# Patient Record
(demographics unavailable — no encounter records)

---

## 2025-05-02 NOTE — ASSESSMENT
[FreeTextEntry1] : 44 year old male with thoracic and lumbar strain, lumbar radiculopathy.  I have provided him with a prescription to attend PT 2-3 times per week and have prescribed Nabumetone 750 mg BID prn pain with food as well as Tizanidine 4mg 1/2-1 tab po bid prn.  He will f/u in 2 weeks for reassessment and will RTW on May 12, 2025 light duty.  He is aware if there is any issue he can contact the office and he verbalizes his understanding and agreement.

## 2025-05-02 NOTE — HISTORY OF PRESENT ILLNESS
[de-identified] : Mr. Julien is a 44 year old male who presents to the office accompanied by his wife for evaluation of mid and lower back pain that began 3 days ago.  He states he is unsure what he did but woke up Tuesday morning in excruciating pain stating he could not breathe without feeling pain in the midback.  He also states the pain radiates down the L leg into his thigh but denies any parasthesias.  He states pain has improved since Tuesday although still continues especially with bending, standing, or ambulating for prolonged periods.  He has been using Aleve with minimal improvement.  He denies any loss of motion but does experience stiffness.

## 2025-05-02 NOTE — IMAGING
[de-identified] : lumbar spine:  5/5 strength, 2+ reflexes, mildly positive SLR on the L at 45 degrees, full ROM with flexion with pain and stiffness, NV intact, + tenderness bilateral lumbar paraspinals.  X-ray Lumbar spine 4 views: mild loss of disc height at L4-5, no fractures or bony abnormalities noted.  thoracic spine:  5/5 strength upper extremities, 2+ reflexes, + tenderness bilateral thoracic paraspinals L>R, NV intact.  X-ray thoracic spine 2 views: no fractures or bony abnormalities noted.

## 2025-05-19 NOTE — IMAGING
[de-identified] : lumbar spine:  5/5 strength, 2+ reflexes, mildly positive SLR on the L at 45 degrees, full ROM with flexion with pain and stiffness, NV intact, + tenderness bilateral lumbar paraspinals.  thoracic spine:  5/5 strength upper extremities, 2+ reflexes, + tenderness bilateral thoracic paraspinals L>R, NV intact.

## 2025-05-19 NOTE — HISTORY OF PRESENT ILLNESS
[de-identified] : ORIGINAL PRESENTATION: Mr. Julien is a 44 year old male who presents to the office accompanied by his wife for evaluation of mid and lower back pain that began 3 days ago.  He states he is unsure what he did but woke up Tuesday morning in excruciating pain stating he could not breathe without feeling pain in the midback.  He also states the pain radiates down the L leg into his thigh but denies any parasthesias.  He states pain has improved since Tuesday although still continues especially with bending, standing, or ambulating for prolonged periods.  He has been using Aleve with minimal improvement.  He denies any loss of motion but does experience stiffness.    TODAY:  I had the pleasure of seeing Mr. Julien accompanied by her  today in follow up.  His previous history and physical findings have been reviewed.  He is under our care for thoracic and lumbar pain which he is receiving continuing active treatment for.  He has been undergoing chiropractic treatments and will be attending PT this upcoming week in order to improve ROM and pain.  He does notice some improvement with seeing a chiropractor but continues to experience pain in the lower back and SI area.  He denies any radicular component and was prescribed Nabumetone and Tizanidine but states he has not had to use it.  He is hoping with the current treatment he will continue to improve and I will evaluate him further at this time.

## 2025-05-19 NOTE — ASSESSMENT
[FreeTextEntry1] : 44 year old male with thoracic and lumbar strain, lumbar radiculopathy.  He will continue to undergo chiropractic treatment and undergo PT 2-3 times per week.  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  He is aware if there is any issue he can contact the office and he verbalizes his understanding and agreement.